# Patient Record
Sex: FEMALE | Race: BLACK OR AFRICAN AMERICAN | NOT HISPANIC OR LATINO | Employment: OTHER | URBAN - METROPOLITAN AREA
[De-identification: names, ages, dates, MRNs, and addresses within clinical notes are randomized per-mention and may not be internally consistent; named-entity substitution may affect disease eponyms.]

---

## 2019-04-30 ENCOUNTER — HOSPITAL ENCOUNTER (EMERGENCY)
Facility: HOSPITAL | Age: 84
Discharge: HOME | End: 2019-05-01
Attending: EMERGENCY MEDICINE
Payer: MEDICARE

## 2019-04-30 DIAGNOSIS — R53.83 FATIGUE: ICD-10-CM

## 2019-04-30 DIAGNOSIS — R73.9 HYPERGLYCEMIA: ICD-10-CM

## 2019-04-30 DIAGNOSIS — R21 RASH: ICD-10-CM

## 2019-04-30 DIAGNOSIS — R41.89 DECREASED LEVEL OF CONSCIOUSNESS: Primary | ICD-10-CM

## 2019-04-30 LAB
ALBUMIN SERPL BCP-MCNC: 4.3 G/DL (ref 3.5–5.2)
ALP SERPL-CCNC: 73 U/L (ref 38–126)
ALT SERPL W/O P-5'-P-CCNC: 24 U/L (ref 10–44)
ANION GAP SERPL CALC-SCNC: 9 MMOL/L (ref 8–16)
AST SERPL-CCNC: 45 U/L (ref 15–46)
BACTERIA #/AREA URNS AUTO: ABNORMAL /HPF
BASOPHILS # BLD AUTO: 0.02 K/UL (ref 0–0.2)
BASOPHILS NFR BLD: 0.2 % (ref 0–1.9)
BILIRUB SERPL-MCNC: 0.7 MG/DL (ref 0.1–1)
BILIRUB UR QL STRIP: NEGATIVE
BUN SERPL-MCNC: 27 MG/DL (ref 7–17)
CALCIUM SERPL-MCNC: 10.3 MG/DL (ref 8.7–10.5)
CHLORIDE SERPL-SCNC: 100 MMOL/L (ref 95–110)
CLARITY UR REFRACT.AUTO: ABNORMAL
CO2 SERPL-SCNC: 29 MMOL/L (ref 23–29)
COLOR UR AUTO: ABNORMAL
CREAT SERPL-MCNC: 1.51 MG/DL (ref 0.5–1.4)
DIFFERENTIAL METHOD: ABNORMAL
EOSINOPHIL # BLD AUTO: 0.1 K/UL (ref 0–0.5)
EOSINOPHIL NFR BLD: 0.8 % (ref 0–8)
ERYTHROCYTE [DISTWIDTH] IN BLOOD BY AUTOMATED COUNT: 13.1 % (ref 11.5–14.5)
EST. GFR  (AFRICAN AMERICAN): 36.3 ML/MIN/1.73 M^2
EST. GFR  (NON AFRICAN AMERICAN): 31.5 ML/MIN/1.73 M^2
GLUCOSE SERPL-MCNC: 395 MG/DL (ref 70–110)
GLUCOSE UR QL STRIP: ABNORMAL
HCT VFR BLD AUTO: 38 % (ref 37–48.5)
HGB BLD-MCNC: 12.6 G/DL (ref 12–16)
HGB UR QL STRIP: ABNORMAL
HYALINE CASTS UR QL AUTO: 0 /LPF
KETONES UR QL STRIP: NEGATIVE
LACTATE SERPL-SCNC: 2.1 MMOL/L (ref 0.5–2.2)
LEUKOCYTE ESTERASE UR QL STRIP: NEGATIVE
LIPASE SERPL-CCNC: 85 U/L (ref 23–300)
LYMPHOCYTES # BLD AUTO: 1.7 K/UL (ref 1–4.8)
LYMPHOCYTES NFR BLD: 17.4 % (ref 18–48)
MCH RBC QN AUTO: 29 PG (ref 27–31)
MCHC RBC AUTO-ENTMCNC: 33.2 G/DL (ref 32–36)
MCV RBC AUTO: 88 FL (ref 82–98)
MICROSCOPIC COMMENT: ABNORMAL
MONOCYTES # BLD AUTO: 0.8 K/UL (ref 0.3–1)
MONOCYTES NFR BLD: 7.7 % (ref 4–15)
NEUTROPHILS # BLD AUTO: 7.4 K/UL (ref 1.8–7.7)
NEUTROPHILS NFR BLD: 73.7 % (ref 38–73)
NITRITE UR QL STRIP: NEGATIVE
NT-PROBNP: 463 PG/ML (ref 5–1800)
PH UR STRIP: 7 [PH] (ref 5–8)
PLATELET # BLD AUTO: 205 K/UL (ref 150–350)
PMV BLD AUTO: 9.9 FL (ref 9.2–12.9)
POCT GLUCOSE: 150 MG/DL (ref 70–110)
POTASSIUM SERPL-SCNC: 3.8 MMOL/L (ref 3.5–5.1)
PROT SERPL-MCNC: 7.9 G/DL (ref 6–8.4)
PROT UR QL STRIP: ABNORMAL
RBC # BLD AUTO: 4.34 M/UL (ref 4–5.4)
RBC #/AREA URNS AUTO: 3 /HPF (ref 0–4)
SODIUM SERPL-SCNC: 138 MMOL/L (ref 136–145)
SP GR UR STRIP: 1.01 (ref 1–1.03)
SQUAMOUS #/AREA URNS AUTO: ABNORMAL /HPF
TROPONIN I SERPL DL<=0.01 NG/ML-MCNC: 0.02 NG/ML (ref 0.01–0.03)
URN SPEC COLLECT METH UR: ABNORMAL
UROBILINOGEN UR STRIP-ACNC: NEGATIVE EU/DL
WBC # BLD AUTO: 9.97 K/UL (ref 3.9–12.7)
WBC #/AREA URNS AUTO: 4 /HPF (ref 0–5)

## 2019-04-30 PROCEDURE — 82962 GLUCOSE BLOOD TEST: CPT | Mod: ER

## 2019-04-30 PROCEDURE — 83880 ASSAY OF NATRIURETIC PEPTIDE: CPT | Mod: ER

## 2019-04-30 PROCEDURE — 81000 URINALYSIS NONAUTO W/SCOPE: CPT | Mod: ER

## 2019-04-30 PROCEDURE — 83690 ASSAY OF LIPASE: CPT | Mod: ER

## 2019-04-30 PROCEDURE — 96374 THER/PROPH/DIAG INJ IV PUSH: CPT | Mod: ER

## 2019-04-30 PROCEDURE — 93010 ELECTROCARDIOGRAM REPORT: CPT | Mod: ,,, | Performed by: STUDENT IN AN ORGANIZED HEALTH CARE EDUCATION/TRAINING PROGRAM

## 2019-04-30 PROCEDURE — 84484 ASSAY OF TROPONIN QUANT: CPT | Mod: ER

## 2019-04-30 PROCEDURE — 99284 EMERGENCY DEPT VISIT MOD MDM: CPT | Mod: ER,25

## 2019-04-30 PROCEDURE — 80053 COMPREHEN METABOLIC PANEL: CPT | Mod: ER

## 2019-04-30 PROCEDURE — 63600175 PHARM REV CODE 636 W HCPCS: Mod: ER | Performed by: PHYSICIAN ASSISTANT

## 2019-04-30 PROCEDURE — 83605 ASSAY OF LACTIC ACID: CPT | Mod: ER

## 2019-04-30 PROCEDURE — 85025 COMPLETE CBC W/AUTO DIFF WBC: CPT | Mod: ER

## 2019-04-30 PROCEDURE — 93005 ELECTROCARDIOGRAM TRACING: CPT | Mod: ER

## 2019-04-30 PROCEDURE — 93010 EKG 12-LEAD: ICD-10-PCS | Mod: ,,, | Performed by: STUDENT IN AN ORGANIZED HEALTH CARE EDUCATION/TRAINING PROGRAM

## 2019-04-30 RX ORDER — TRAZODONE HYDROCHLORIDE 50 MG/1
50 TABLET ORAL
COMMUNITY
Start: 2019-04-26

## 2019-04-30 RX ADMIN — INSULIN HUMAN 10 UNITS: 100 INJECTION, SOLUTION PARENTERAL at 10:04

## 2019-05-01 VITALS
WEIGHT: 137 LBS | HEART RATE: 54 BPM | SYSTOLIC BLOOD PRESSURE: 162 MMHG | TEMPERATURE: 99 F | OXYGEN SATURATION: 98 % | BODY MASS INDEX: 25.21 KG/M2 | DIASTOLIC BLOOD PRESSURE: 64 MMHG | RESPIRATION RATE: 18 BRPM | HEIGHT: 62 IN

## 2019-05-01 LAB — POCT GLUCOSE: 127 MG/DL (ref 70–110)

## 2019-05-01 PROCEDURE — 82962 GLUCOSE BLOOD TEST: CPT | Mod: 59,ER

## 2019-05-01 NOTE — ED NOTES
"To ER via wheelchair with daughter, main caregiver; daughter reports pt has been increasingly "tired" and leaning to L side with rash on L wrist x 2 days; reports pt recently started on Trazodone to help with sleep, states symptoms started shortly after starting meds; pt has hx of alzheimer's and is confused at baseline; nonverbal upon assessment; no acute distress noted; resp even and unlabored  "

## 2019-05-01 NOTE — DISCHARGE INSTRUCTIONS
You are advised to stop your mother's trazodone.  You are instructed to follow up with her primary care provider for re-evaluation within 2 days.  You are instructed to return to the emergency department immediately for any new or worsening symptoms.

## 2019-05-01 NOTE — ED PROVIDER NOTES
Encounter Date: 4/30/2019       History     Chief Complaint   Patient presents with    Rash     pts family reports rash to left arm/hand; pt recently started trazodone on friday night - rash 1st noted on sunday. Pts family also reports decreased activity since beginning trazodone as well.     84-year-old female presents to the emergency department with her daughter for evaluation of 4 day history of rash to the left upper extremity, decreased mobility and decrease level of consciousness since starting trazodone.  Daughter states that the patient was placed on trazodone on 4/26/19 and on 04/27 she noticed a rash to the patient's left forearm.  The daughter has also noted that over the weekend the patient has gone from walking with her walker to barely being able to sit up in her wheelchair.  Daughter reports that the patient who has dementia, has also been slightly slurring her words.  Daughter reports that the last dose of trazodone was yesterday afternoon.  Daughter reports that the patient has not had a bowel movement in approximately 3-4 days.  Daughter reports that this is not uncommon as usually takes approximately that amount of time for the patient have a bowel movement any way.  Daughter reports that the patient did complain of bowel mild back pain this morning but has not mentioned it since that time.  Daughter denies any fever, vomiting or diarrhea.  No treatment was attempted prior to arrival.        Review of patient's allergies indicates:  No Known Allergies  Past Medical History:   Diagnosis Date    Arthritis     Chronic kidney disease (CKD), stage III (moderate)     Dementia with behavioral disturbance     Diabetes mellitus     Hyperlipidemia     Hypertension      Past Surgical History:   Procedure Laterality Date    TOTAL KNEE ARTHROPLASTY Left      History reviewed. No pertinent family history.  Social History     Tobacco Use    Smoking status: Never Smoker   Substance Use Topics     Alcohol use: Not Currently    Drug use: Not on file     Review of Systems   Constitutional: Positive for activity change and fatigue. Negative for appetite change and fever.   HENT: Negative for congestion, ear discharge, nosebleeds, rhinorrhea, sinus pressure, sore throat, trouble swallowing and voice change.    Eyes: Negative for discharge and redness.   Respiratory: Negative for cough, chest tightness, shortness of breath and wheezing.    Cardiovascular: Negative for chest pain.   Gastrointestinal: Negative for abdominal pain, constipation, diarrhea, nausea and vomiting.   Genitourinary: Negative for decreased urine volume and dysuria.   Musculoskeletal: Positive for back pain. Negative for neck pain.   Neurological: Positive for weakness. Negative for syncope.       Physical Exam     Initial Vitals [04/30/19 1930]   BP Pulse Resp Temp SpO2   (!) 176/73 (!) 54 20 98.7 °F (37.1 °C) 97 %      MAP       --         Physical Exam    Nursing note and vitals reviewed.  Constitutional: She appears well-developed and well-nourished. She appears lethargic. She is not diaphoretic. No distress.   HENT:   Head: Normocephalic and atraumatic.   Right Ear: External ear normal.   Left Ear: External ear normal.   Nose: Nose normal.   Mouth/Throat: Oropharynx is clear and moist. No oropharyngeal exudate.   Eyes: Conjunctivae and EOM are normal. Pupils are equal, round, and reactive to light.   Neck: Normal range of motion. Neck supple.   Cardiovascular: Normal rate, regular rhythm and normal heart sounds. Exam reveals no gallop and no friction rub.    No murmur heard.  Pulmonary/Chest: Breath sounds normal. No respiratory distress. She has no wheezes. She has no rhonchi. She has no rales. She exhibits no tenderness.   Abdominal: Soft. Bowel sounds are normal. She exhibits no distension. There is no tenderness. There is no rebound.   Neurological: She appears lethargic. GCS eye subscore is 4. GCS verbal subscore is 5. GCS motor  subscore is 6.   Skin: Skin is warm and dry.   Psychiatric: She has a normal mood and affect.         ED Course   Procedures  Labs Reviewed   CBC W/ AUTO DIFFERENTIAL - Abnormal; Notable for the following components:       Result Value    Gran% 73.7 (*)     Lymph% 17.4 (*)     All other components within normal limits   COMPREHENSIVE METABOLIC PANEL - Abnormal; Notable for the following components:    Glucose 395 (*)     BUN, Bld 27 (*)     Creatinine 1.51 (*)     eGFR if  36.3 (*)     eGFR if non  31.5 (*)     All other components within normal limits   URINALYSIS, REFLEX TO URINE CULTURE - Abnormal; Notable for the following components:    Appearance, UA Cloudy (*)     Protein, UA 1+ (*)     Glucose, UA 2+ (*)     Occult Blood UA Trace (*)     All other components within normal limits    Narrative:     Preferred Collection Type->Urine, Clean Catch   URINALYSIS MICROSCOPIC - Abnormal; Notable for the following components:    Bacteria Many (*)     All other components within normal limits    Narrative:     Preferred Collection Type->Urine, Clean Catch   POCT GLUCOSE - Abnormal; Notable for the following components:    POCT Glucose 150 (*)     All other components within normal limits   LIPASE   LACTIC ACID, PLASMA   TROPONIN I   NT-PRO NATRIURETIC PEPTIDE   POCT GLUCOSE MONITORING CONTINUOUS     EKG Readings: (Independently Interpreted)   Initial Reading: No STEMI. Rhythm: Sinus Bradycardia. Heart Rate: 54.       Imaging Results          CT Head Without Contrast (Final result)  Result time 04/30/19 21:21:16    Final result by Praveen Washington Jr., MD (04/30/19 21:21:16)                 Impression:      No acute findings.  Chronic white matter change with central atrophy as above.    All CT scans at this facility use dose modulation, iterative reconstruction, and/or weight base dosing when appropriate to reduce radiation dose to as low as reasonably achievable.      Electronically  signed by: Praveen Washington Jr., MD  Date:    04/30/2019  Time:    21:21             Narrative:    EXAMINATION:  CT HEAD WITHOUT CONTRAST    CLINICAL HISTORY:  Confusion/delirium, altered LOC, unexplained;    TECHNIQUE:  Contiguous axial images were obtained from the skull base through the vertex without intravenous contrast.    COMPARISON:  None    FINDINGS:  No intracranial hemorrhage. No mass effect or midline shift. Moderate/severe degree of patchy low density within the white matter.  Chronic lacunar infarcts within the bilateral basal ganglia.  Generalized involutional change with central atrophy.  The paranasal sinuses and mastoid air cells are clear.  No concerning osseous findings.                               X-Ray Chest 1 View (Final result)  Result time 04/30/19 20:37:36   Procedure changed from X-Ray Chest PA And Lateral     Final result by Praveen Washington Jr., MD (04/30/19 20:37:36)                 Impression:      No acute findings.  Mild cardiomegaly.      Electronically signed by: Praveen Washington Jr., MD  Date:    04/30/2019  Time:    20:37             Narrative:    EXAMINATION:  XR CHEST 1 VIEW    CLINICAL HISTORY:  fatigue; Other fatigue    TECHNIQUE:  Single frontal view of the chest was performed.    COMPARISON:  None    FINDINGS:  Overlying breast shadow causes attenuation of the inferior left hemithorax.  There is crowding of the lung markings.No airspace opacity.    The cardiac silhouette is mildly enlarged.  Calcific change of the aortic knob..  The hilar and mediastinal contours are unremarkable.    Bones are intact.                                 Medical Decision Making:   Initial Assessment:   84-year-old female presents for evaluation of rash, decreased level of consciousness and decreased mobility status post starting trazodone.  Physical exam reveals a nontoxic-appearing female in no acute distress. Patient is mildly lethargic but does respond to verbal stimuli.  Neck is supple, no  meningeal signs noted.  Lungs clear to auscultation bilaterally. No respiratory distress or accessory muscle use noted.  Abdominal exam reveals a soft abdomen, nontender to palpation. No CVA tenderness noted. Skin exam reveals a few small erythematous papules noted to the dorsal aspect of the left distal arm.  No vesicles, pustules or bulla noted.  No ulcerations or petechiae noted. No induration or warmth noted.  Differential Diagnosis:   CT of the head ordered to assess possible serious etiology including stroke, hemorrhage or space-occupying mass  ACS  Electrolyte abnormality  Dehydration  UTI    ED Management:  CBC reveals no acute leukocytosis or anemia.  CMP results revealed glucose 395, BUN 27 creatinine 1.51.  Lipase 85.  Lactic acid 2.1.  Troponin 0.020, BMP for 63.  CT reveals no acute findings.  Chronic white matter change with central atrophy as above.  Chest x-ray reveals mild cardiomegaly.  No acute findings noted. Patient was given insulin for hyperglycemia.  Repeat  Glucose was 150.  Patient ate a pudding cup and a turkey frozen dinner prior to discharge. Discussed these findings at length with the daughter who verbalizes understanding and agreement course of treatment.  Instructed the daughter to follow up with her primary care provider for re-evaluation tomorrow.  Instructed the daughter to return to the emergency department immediately for any new or worsening symptoms.  I discussed this patient at length with Dr. Hamm  who is in agreement with the course of treatment.                      Clinical Impression:       ICD-10-CM ICD-9-CM   1. Decreased level of consciousness R40.4 780.09   2. Fatigue R53.83 780.79   3. Rash R21 782.1   4. Hyperglycemia R73.9 790.29                                Chuyita Ratliff PA-C  05/01/19 0025

## 2019-05-01 NOTE — ED NOTES
Pt given food and consumed all of her pudding and apple sauce, drank and entire 8 ounce milk and ate several bites of cooked turkey.

## 2019-05-01 NOTE — ED NOTES
No s/s of distress noted. Pt calm and cooperative. Reoriented to time. Daughter at bedside. All needs currently met.

## 2019-05-01 NOTE — ED TRIAGE NOTES
pts family reports rash to left arm/hand; pt recently started trazodone on friday night - rash 1st noted on sunday. Pts family also reports decreased activity since beginning trazodone as well.